# Patient Record
Sex: MALE | Race: WHITE | NOT HISPANIC OR LATINO | URBAN - METROPOLITAN AREA
[De-identification: names, ages, dates, MRNs, and addresses within clinical notes are randomized per-mention and may not be internally consistent; named-entity substitution may affect disease eponyms.]

---

## 2022-05-19 ENCOUNTER — LAB OUTSIDE AN ENCOUNTER (OUTPATIENT)
Dept: URBAN - METROPOLITAN AREA CLINIC 113 | Facility: CLINIC | Age: 52
End: 2022-05-19

## 2022-05-19 ENCOUNTER — WEB ENCOUNTER (OUTPATIENT)
Dept: URBAN - METROPOLITAN AREA CLINIC 113 | Facility: CLINIC | Age: 52
End: 2022-05-19

## 2022-05-19 ENCOUNTER — OFFICE VISIT (OUTPATIENT)
Dept: URBAN - METROPOLITAN AREA CLINIC 113 | Facility: CLINIC | Age: 52
End: 2022-05-19
Payer: COMMERCIAL

## 2022-05-19 VITALS
WEIGHT: 169 LBS | HEART RATE: 78 BPM | BODY MASS INDEX: 25.03 KG/M2 | RESPIRATION RATE: 18 BRPM | TEMPERATURE: 98.4 F | SYSTOLIC BLOOD PRESSURE: 123 MMHG | DIASTOLIC BLOOD PRESSURE: 77 MMHG | HEIGHT: 69 IN

## 2022-05-19 DIAGNOSIS — R14.3 EXCESSIVE GAS: ICD-10-CM

## 2022-05-19 DIAGNOSIS — R19.8 IRREGULAR BOWEL HABITS: ICD-10-CM

## 2022-05-19 DIAGNOSIS — Z12.11 COLON CANCER SCREENING: ICD-10-CM

## 2022-05-19 PROCEDURE — 99203 OFFICE O/P NEW LOW 30 MIN: CPT | Performed by: NURSE PRACTITIONER

## 2022-05-19 RX ORDER — ATORVASTATIN CALCIUM 10 MG/1
1 TABLET TABLET, FILM COATED ORAL ONCE A DAY
Status: ACTIVE | COMMUNITY

## 2022-05-19 RX ORDER — SERTRALINE 25 MG/1
1 TABLET TABLET, FILM COATED ORAL ONCE A DAY
Status: ACTIVE | COMMUNITY

## 2022-05-19 RX ORDER — SODIUM, POTASSIUM,MAG SULFATES 17.5-3.13G
354 ML SOLUTION, RECONSTITUTED, ORAL ORAL ONCE
Qty: 354 MILLILITER | Refills: 0 | OUTPATIENT
Start: 2022-05-19 | End: 2022-05-20

## 2022-05-19 NOTE — HPI-TODAY'S VISIT:
This is a 51-year-old male with a history of depression and hyperlipidemia referred from Dr. Muñoz for colon cancer screening. He has not had a prior colonoscopy.  He denies a family history of colon cancer.  He reports a chronic history of loose stools.  He typically has 2 bowel movements per day, 50% of which are loose.  He typically does not experience urgency and nocturnal stools are rare.  He admits excessive gas and "rumbling" in his stomach.  He denies red blood per rectum, abdominal pain or cramping, or any other abdominal symptoms.  He eats cheese but limits milk and ice cream.

## 2022-05-21 PROBLEM — 80301007: Status: ACTIVE | Noted: 2022-05-21

## 2022-06-02 PROBLEM — 305058001: Status: ACTIVE | Noted: 2022-05-21

## 2022-07-19 ENCOUNTER — WEB ENCOUNTER (OUTPATIENT)
Dept: URBAN - METROPOLITAN AREA SURGERY CENTER 25 | Facility: SURGERY CENTER | Age: 52
End: 2022-07-19

## 2022-07-19 ENCOUNTER — OFFICE VISIT (OUTPATIENT)
Dept: URBAN - METROPOLITAN AREA SURGERY CENTER 25 | Facility: SURGERY CENTER | Age: 52
End: 2022-07-19
Payer: COMMERCIAL

## 2022-07-19 DIAGNOSIS — D12.2 ADENOMA OF ASCENDING COLON: ICD-10-CM

## 2022-07-19 DIAGNOSIS — Z12.11 COLON CANCER SCREENING: ICD-10-CM

## 2022-07-19 PROCEDURE — 45385 COLONOSCOPY W/LESION REMOVAL: CPT | Performed by: INTERNAL MEDICINE

## 2022-07-19 PROCEDURE — G8907 PT DOC NO EVENTS ON DISCHARG: HCPCS | Performed by: INTERNAL MEDICINE

## 2022-07-19 RX ORDER — ATORVASTATIN CALCIUM 10 MG/1
1 TABLET TABLET, FILM COATED ORAL ONCE A DAY
Status: ACTIVE | COMMUNITY

## 2022-07-19 RX ORDER — SERTRALINE 25 MG/1
1 TABLET TABLET, FILM COATED ORAL ONCE A DAY
Status: ACTIVE | COMMUNITY

## 2022-09-23 ENCOUNTER — OFFICE VISIT (OUTPATIENT)
Dept: URBAN - METROPOLITAN AREA CLINIC 113 | Facility: CLINIC | Age: 52
End: 2022-09-23
Payer: COMMERCIAL

## 2022-09-23 VITALS
SYSTOLIC BLOOD PRESSURE: 134 MMHG | HEART RATE: 76 BPM | DIASTOLIC BLOOD PRESSURE: 87 MMHG | RESPIRATION RATE: 20 BRPM | BODY MASS INDEX: 24.73 KG/M2 | WEIGHT: 167 LBS | HEIGHT: 69 IN | TEMPERATURE: 97.8 F

## 2022-09-23 DIAGNOSIS — Z86.010 HISTORY OF ADENOMATOUS POLYP OF COLON: ICD-10-CM

## 2022-09-23 DIAGNOSIS — K64.8 INTERNAL HEMORRHOID: ICD-10-CM

## 2022-09-23 DIAGNOSIS — K57.30 COLON, DIVERTICULOSIS: ICD-10-CM

## 2022-09-23 DIAGNOSIS — R19.8 IRREGULAR BOWEL HABITS: ICD-10-CM

## 2022-09-23 PROCEDURE — 99213 OFFICE O/P EST LOW 20 MIN: CPT | Performed by: NURSE PRACTITIONER

## 2022-09-23 RX ORDER — SERTRALINE 25 MG/1
1 TABLET TABLET, FILM COATED ORAL ONCE A DAY
Status: ACTIVE | COMMUNITY

## 2022-09-23 RX ORDER — LOSARTAN POTASSIUM 50 MG/1
1 TABLET TABLET ORAL ONCE A DAY
Status: ACTIVE | COMMUNITY

## 2022-09-23 RX ORDER — ATORVASTATIN CALCIUM 10 MG/1
1 TABLET TABLET, FILM COATED ORAL ONCE A DAY
Status: ACTIVE | COMMUNITY

## 2022-09-23 RX ORDER — FINASTERIDE 1 MG/1
1 TABLET TABLET, FILM COATED ORAL ONCE A DAY
Status: ACTIVE | COMMUNITY

## 2022-09-23 NOTE — HPI-OTHER HISTORIES
Colonoscopy 7/19/2022:BB PS 9 (Suprep), sigmoid and descending diverticulosis, removal of 2 transverse and ascending 3 to 4 mm sessile polyps, moderate grade 1 nonbleeding internal hemorrhoids.  Pathology: The ascending polyp was reported as a tubular adenoma and the transverse polyp was reported as vegetable matter/no large bowel mucosa identified.  Surveillance recommended in 2027.recall set

## 2022-09-23 NOTE — HPI-TODAY'S VISIT:
This is a 52-year-old male with a history of depression, hyperlipidemia, irregular bowel habits with excessive gas presenting for follow-up after a screening colonoscopy. He was initially seen 5/19/2022.  He reported intermittent loose stools and excessive gas.  It was discussed this was likely associated with diet or functional bowel disorder.  He was to begin daily fiber and was given information on gas and a low FODMAP diet for his consideration.  He was scheduled for a screening colonoscopy.  He is taking Benefiber 2 tablespoons daily.  He has less days during which his stools are loose and has less gas.  He is pleased with his response to fiber.

## 2022-09-24 ENCOUNTER — DASHBOARD ENCOUNTERS (OUTPATIENT)
Age: 52
End: 2022-09-24

## 2022-09-24 PROBLEM — 444702007: Status: ACTIVE | Noted: 2022-05-21

## 2022-09-24 PROBLEM — 733657002: Status: ACTIVE | Noted: 2022-09-24

## 2022-09-24 PROBLEM — 90458007: Status: ACTIVE | Noted: 2022-09-24

## 2022-09-24 PROBLEM — 429047008: Status: ACTIVE | Noted: 2022-09-24
